# Patient Record
(demographics unavailable — no encounter records)

---

## 2025-02-18 NOTE — PHYSICAL EXAM
[Well-appearing] : well-appearing [Normocephalic] : normocephalic [No dysmorphic facial features] : no dysmorphic facial features [No ocular abnormalities] : no ocular abnormalities [Neck supple] : neck supple [Lungs clear] : lungs clear [Heart sounds regular in rate and rhythm] : heart sounds regular in rate and rhythm [Soft] : soft [No abnormal neurocutaneous stigmata or skin lesions] : no abnormal neurocutaneous stigmata or skin lesions [Straight] : straight [No deformities] : no deformities [Alert] : alert [Conversant] : conversant [Normal speech and language] : normal speech and language [Follows instructions well] : follows instructions well [VFF] : VFF [Pupils reactive to light and accommodation] : pupils reactive to light and accommodation [Full extraocular movements] : full extraocular movements [No nystagmus] : no nystagmus [Normal facial sensation to light touch] : normal facial sensation to light touch [No facial asymmetry or weakness] : no facial asymmetry or weakness [Gross hearing intact] : gross hearing intact [Equal palate elevation] : equal palate elevation [Good shoulder shrug] : good shoulder shrug [Normal tongue movement] : normal tongue movement [Midline tongue, no fasciculations] : midline tongue, no fasciculations [Normal axial and appendicular muscle tone] : normal axial and appendicular muscle tone [Gets up on table without difficulty] : gets up on table without difficulty [Normal finger tapping and fine finger movements] : normal finger tapping and fine finger movements [No abnormal involuntary movements] : no abnormal involuntary movements [5/5 strength in proximal and distal muscles of arms and legs] : 5/5 strength in proximal and distal muscles of arms and legs [Walks and runs well] : walks and runs well [Able to do deep knee bend] : able to do deep knee bend [2+ biceps] : 2+ biceps [Triceps] : triceps [Knee jerks] : knee jerks [Localizes LT and temperature] : localizes LT and temperature [Good walking balance] : good walking balance [Normal gait] : normal gait [de-identified] : limited eye contact

## 2025-02-18 NOTE — HISTORY OF PRESENT ILLNESS
[Snoring] : snoring [Mouth Breathing] : mouth breathing [FreeTextEntry1] : Regla presents in follow up of her ADHD. The decision was made to trail stimulant medication after phone conversation with mom after last visit. Methylphenidate has been helpful for her focus and attention and has not worsened her emotional behavior in school and out of school. Compliant with medication and denies side effects. Medication tends to wear off around lunch time.    Mom reports issues regarding sleep. Difficultly falling asleep and staying asleep. She snores heavily.

## 2025-02-18 NOTE — ASSESSMENT
[FreeTextEntry1] : Regla is a 5yo with hx of ADHD. Symptoms seem to be improved with stimulant medication.  Plan to: - Increase methylphenidate to 10mg.  - Sleep study to evaluate for ERON - Neuropsychology appt pending. Pt is undergoing a ST evaluation today. - Follow up in 2 months.